# Patient Record
Sex: MALE | Race: WHITE | NOT HISPANIC OR LATINO | URBAN - METROPOLITAN AREA
[De-identification: names, ages, dates, MRNs, and addresses within clinical notes are randomized per-mention and may not be internally consistent; named-entity substitution may affect disease eponyms.]

---

## 2022-07-09 ENCOUNTER — TELEPHONE ENCOUNTER (OUTPATIENT)
Dept: URBAN - METROPOLITAN AREA CLINIC 121 | Facility: CLINIC | Age: 87
End: 2022-07-09

## 2022-07-09 RX ORDER — ALBUTEROL SULFATE
POWDER (GRAM) MISCELLANEOUS
Refills: 0 | OUTPATIENT
Start: 2019-06-27 | End: 2019-08-23

## 2022-07-09 RX ORDER — FINASTERIDE 1 MG/1
TABLET, FILM COATED ORAL ONCE A DAY
Refills: 0 | OUTPATIENT
Start: 2019-06-27 | End: 2019-08-23

## 2022-07-09 RX ORDER — AMOXICILLIN 500 MG/1
FOR DENAL PROCEDURE CAPSULE ORAL
Refills: 0 | OUTPATIENT
Start: 2019-06-27 | End: 2019-08-23

## 2022-07-09 RX ORDER — TERAZOSIN 5 MG/1
CAPSULE ORAL
Refills: 0 | OUTPATIENT
Start: 2019-06-27 | End: 2019-08-23

## 2022-07-09 RX ORDER — WARFARIN 2 MG/1
TABLET ORAL
Refills: 0 | OUTPATIENT
Start: 2019-06-27 | End: 2019-08-23

## 2022-07-09 RX ORDER — OMEPRAZOLE 20 MG/1
25 MINS BEFORE DINNER CAPSULE, DELAYED RELEASE ORAL
Refills: 0 | OUTPATIENT
Start: 2019-06-27 | End: 2019-08-23

## 2022-07-09 RX ORDER — LEVOTHYROXINE SODIUM 25 UG/1
1 TAB ( 2 DAYS); 2 TABS (5DAYS) TABLET ORAL
Refills: 0 | OUTPATIENT
Start: 2019-06-27 | End: 2019-08-23

## 2022-07-09 RX ORDER — KRILL/OM-3/DHA/EPA/PHOSPHO/AST 1000-230MG
CAPSULE ORAL
Refills: 0 | OUTPATIENT
Start: 2019-06-27 | End: 2019-08-23

## 2022-07-09 RX ORDER — ATORVASTATIN CALCIUM 10 MG/1
1 TAB WEEKLY TABLET, FILM COATED ORAL
Refills: 0 | OUTPATIENT
Start: 2019-06-27 | End: 2019-08-23

## 2022-07-10 ENCOUNTER — TELEPHONE ENCOUNTER (OUTPATIENT)
Dept: URBAN - METROPOLITAN AREA CLINIC 121 | Facility: CLINIC | Age: 87
End: 2022-07-10

## 2022-07-10 RX ORDER — KRILL/OM-3/DHA/EPA/PHOSPHO/AST 1000-230MG
CAPSULE ORAL
Refills: 0 | Status: ACTIVE | COMMUNITY
Start: 2019-08-23

## 2022-07-10 RX ORDER — ALBUTEROL SULFATE
POWDER (GRAM) MISCELLANEOUS
Refills: 0 | Status: ACTIVE | COMMUNITY
Start: 2019-08-23

## 2022-07-10 RX ORDER — LEVOTHYROXINE SODIUM 25 UG/1
1 TAB ( 2 DAYS); 2 TABS (5DAYS) TABLET ORAL
Refills: 0 | Status: ACTIVE | COMMUNITY
Start: 2019-08-23

## 2022-07-10 RX ORDER — OMEPRAZOLE 40 MG/1
TAKE ONE CAPSULE PO 30 MINUTES BEFORE MEAL TWICE DAILY CAPSULE, DELAYED RELEASE ORAL TWICE A DAY
Refills: 1 | Status: ACTIVE | COMMUNITY
Start: 2019-08-26

## 2022-07-10 RX ORDER — TERAZOSIN 5 MG/1
CAPSULE ORAL
Refills: 0 | Status: ACTIVE | COMMUNITY
Start: 2019-08-23

## 2022-07-10 RX ORDER — OMEPRAZOLE 20 MG/1
25 MINS BEFORE DINNER CAPSULE, DELAYED RELEASE ORAL
Refills: 0 | Status: ACTIVE | COMMUNITY
Start: 2019-08-23

## 2022-07-10 RX ORDER — FINASTERIDE 1 MG/1
TABLET, FILM COATED ORAL ONCE A DAY
Refills: 0 | Status: ACTIVE | COMMUNITY
Start: 2019-08-23

## 2022-07-10 RX ORDER — ATORVASTATIN CALCIUM 10 MG/1
1 TAB WEEKLY TABLET, FILM COATED ORAL
Refills: 0 | Status: ACTIVE | COMMUNITY
Start: 2019-08-23

## 2022-07-10 RX ORDER — WARFARIN 2 MG/1
TABLET ORAL
Refills: 0 | Status: ACTIVE | COMMUNITY
Start: 2019-08-23

## 2022-07-10 RX ORDER — AMOXICILLIN 500 MG/1
FOR DENAL PROCEDURE CAPSULE ORAL
Refills: 0 | Status: ACTIVE | COMMUNITY
Start: 2019-08-23

## 2023-11-15 ENCOUNTER — TELEPHONE ENCOUNTER (OUTPATIENT)
Dept: URBAN - METROPOLITAN AREA CLINIC 63 | Facility: CLINIC | Age: 88
End: 2023-11-15

## 2024-01-15 ENCOUNTER — DASHBOARD ENCOUNTERS (OUTPATIENT)
Age: 89
End: 2024-01-15

## 2024-01-15 PROBLEM — 1082761000119106: Status: ACTIVE | Noted: 2024-01-15

## 2024-01-17 ENCOUNTER — OFFICE VISIT (OUTPATIENT)
Dept: URBAN - METROPOLITAN AREA CLINIC 63 | Facility: CLINIC | Age: 89
End: 2024-01-17

## 2024-01-17 RX ORDER — LEVOTHYROXINE SODIUM 25 UG/1
1 TAB ( 2 DAYS); 2 TABS (5DAYS) TABLET ORAL
Refills: 0 | COMMUNITY
Start: 2019-08-23

## 2024-01-17 RX ORDER — ALBUTEROL SULFATE
POWDER (GRAM) MISCELLANEOUS
Refills: 0 | COMMUNITY
Start: 2019-08-23

## 2024-01-17 RX ORDER — OMEPRAZOLE 40 MG/1
TAKE ONE CAPSULE PO 30 MINUTES BEFORE MEAL TWICE DAILY CAPSULE, DELAYED RELEASE ORAL TWICE A DAY
Refills: 1 | COMMUNITY
Start: 2019-08-26

## 2024-01-17 RX ORDER — AMOXICILLIN 500 MG/1
FOR DENAL PROCEDURE CAPSULE ORAL
Refills: 0 | COMMUNITY
Start: 2019-08-23

## 2024-01-17 RX ORDER — KRILL/OM-3/DHA/EPA/PHOSPHO/AST 1000-230MG
CAPSULE ORAL
Refills: 0 | COMMUNITY
Start: 2019-08-23

## 2024-01-17 RX ORDER — FINASTERIDE 1 MG/1
TABLET, FILM COATED ORAL ONCE A DAY
Refills: 0 | COMMUNITY
Start: 2019-08-23

## 2024-01-17 RX ORDER — ATORVASTATIN CALCIUM 10 MG/1
1 TAB WEEKLY TABLET, FILM COATED ORAL
Refills: 0 | COMMUNITY
Start: 2019-08-23

## 2024-01-17 RX ORDER — WARFARIN 2 MG/1
TABLET ORAL
Refills: 0 | COMMUNITY
Start: 2019-08-23

## 2024-01-17 RX ORDER — TERAZOSIN 5 MG/1
CAPSULE ORAL
Refills: 0 | COMMUNITY
Start: 2019-08-23

## 2024-01-17 RX ORDER — OMEPRAZOLE 20 MG/1
25 MINS BEFORE DINNER CAPSULE, DELAYED RELEASE ORAL
Refills: 0 | COMMUNITY
Start: 2019-08-23

## 2024-01-17 NOTE — HPI-TODAY'S VISIT:
Edward is a pleasant 89-year-old male who presents today for evaluation of dyspepsia and dysphagia.  History of De's esophagus with high-grade dysplasia.  Status post EMR radiofrequency ablation.  Maternal history of colon cancer in her 80s. Recently discharged from the hospital and treated for pneumonia.  EGD in August 2019 showed normal-appearing Z-line.  Small hiatal hernia.  Polypoid mucosa in the body of the stomach.  Mild gastritis.  Mild chronic inactive gastritis negative for H. pylori.  Fragments of fundic gland polyp.  Distal esophageal biopsy showed squamocolumnar junction mucosa with chronic inflammation.  Negative for intestinal metaplasia.  Negative for fungal organisms Labs dated 1/2/2024 showed WBC 13.9, RBC 3.67, hemoglobin 9.7, hematocrit 30.6.  Normal platelets.  PT 37.1, INR 3.68.  Normal renal function.  Normal LFTs.  CRP 3.2 (H). Colonoscopy December 2015 showed normal ileum.  Foreign body in the cecum (button battery).  Removal was successful.  Diverticulosis in the sigmoid and descending colon.  Exam otherwise normal. CT abdomen/pelvis without contrast dated 8/21/2019 showed no acute abdominal pelvic pathology.  Diverticulosis.  Moderate right sided fat containing inguinal hernia